# Patient Record
Sex: FEMALE | Race: WHITE | Employment: UNEMPLOYED | URBAN - METROPOLITAN AREA
[De-identification: names, ages, dates, MRNs, and addresses within clinical notes are randomized per-mention and may not be internally consistent; named-entity substitution may affect disease eponyms.]

---
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
## 2024-07-18 ENCOUNTER — HOSPITAL ENCOUNTER (OUTPATIENT)
Age: 24
Discharge: HOME | End: 2024-07-18

## 2024-07-18 LAB — HBV SURFACE AB SERPL IA-ACNC: 525 MIU/ML

## 2024-07-18 PROCEDURE — 86317 IMMUNOASSAY INFECTIOUS AGENT: CPT

## 2024-07-18 PROCEDURE — 86481 TB AG RESPONSE T-CELL SUSP: CPT

## 2024-07-22 LAB — T-SPOT TB TEST: NORMAL
